# Patient Record
Sex: FEMALE | Race: WHITE | NOT HISPANIC OR LATINO
[De-identification: names, ages, dates, MRNs, and addresses within clinical notes are randomized per-mention and may not be internally consistent; named-entity substitution may affect disease eponyms.]

---

## 2023-06-08 ENCOUNTER — APPOINTMENT (OUTPATIENT)
Dept: DERMATOLOGY | Facility: CLINIC | Age: 53
End: 2023-06-08
Payer: COMMERCIAL

## 2023-06-08 VITALS — WEIGHT: 180 LBS | HEIGHT: 63 IN | BODY MASS INDEX: 31.89 KG/M2

## 2023-06-08 DIAGNOSIS — D22.9 MELANOCYTIC NEVI, UNSPECIFIED: ICD-10-CM

## 2023-06-08 DIAGNOSIS — D18.01 HEMANGIOMA OF SKIN AND SUBCUTANEOUS TISSUE: ICD-10-CM

## 2023-06-08 DIAGNOSIS — L82.1 OTHER SEBORRHEIC KERATOSIS: ICD-10-CM

## 2023-06-08 DIAGNOSIS — Z12.83 ENCOUNTER FOR SCREENING FOR MALIGNANT NEOPLASM OF SKIN: ICD-10-CM

## 2023-06-08 DIAGNOSIS — D48.5 NEOPLASM OF UNCERTAIN BEHAVIOR OF SKIN: ICD-10-CM

## 2023-06-08 PROBLEM — Z00.00 ENCOUNTER FOR PREVENTIVE HEALTH EXAMINATION: Status: ACTIVE | Noted: 2023-06-08

## 2023-06-08 PROCEDURE — 99203 OFFICE O/P NEW LOW 30 MIN: CPT

## 2023-06-08 NOTE — HISTORY OF PRESENT ILLNESS
[FreeTextEntry1] : Skin lesions - NPA [de-identified] : # Mole/skin check\par Concerns today: Brown spot on R temple, pink spot on chest\par Sunscreen use: rarely \par Hx of blistering sun burns: no \par Hx of immunosuppression: no \par Personal history of skin cancer:no \par Family history of skin cancer:no \par Last CBE a few years ago by outside derm\par \par SH: Parents Paramjit and Sunni Fitzgerald are my patient

## 2023-06-08 NOTE — ASSESSMENT
[FreeTextEntry1] : # Neoplasm of uncertain behavior of skin - L chest \par # Neoplasm of uncertain behavior of skin - R temple\par - L chest: favor LPLK or SK, r/o early NMSC - will monitor (photo taken) FU 6 months\par - R temple: favor solar lentigo - will monitor (size and photo taken) FU 6 months\par \par # Cherry angiomas\par # Seborrheic keratosis\par - Benign skin lesions, no concerns\par \par # Lentigines\par # Multiple nevi\par - Benign appearing on today's examination\par - Monitor for change\par \par # Skin cancer screening\par - A complete skin exam was performed\par - No other concerning lesions identified\par - Photoprotection was discussed including sunscreen\par - ABCDEs of melanoma reviewed\par - Call for new or changing lesions\par - CBE yearly; spot check in 6 months\par

## 2023-06-08 NOTE — PHYSICAL EXAM
[Alert] : alert [Oriented x 3] : ~L oriented x 3 [Full Body Skin Exam Performed] : performed [FreeTextEntry3] : Pt consented to examination of external genitalia and buttocks\par Opaque nail polish on fingernails and toenails\par \par Diffuse brown/tan ill defined macules on sun exposed areas including approx 1.3 x 0.8 cm patch on R temple (photo taken)\par Pink and tan papule on L chest approx 6 mm\par Small erythematous papules scattered\par Stuck on brown papules scattered including R helix\par Symmetric brown macules and papules. No ABCD features. No concerning features on dermoscopy.\par \par

## 2024-04-16 ENCOUNTER — APPOINTMENT (OUTPATIENT)
Dept: DERMATOLOGY | Facility: CLINIC | Age: 54
End: 2024-04-16
Payer: COMMERCIAL

## 2024-04-16 VITALS — HEIGHT: 63 IN | BODY MASS INDEX: 32.78 KG/M2 | WEIGHT: 185 LBS

## 2024-04-16 DIAGNOSIS — L30.9 DERMATITIS, UNSPECIFIED: ICD-10-CM

## 2024-04-16 DIAGNOSIS — L81.4 OTHER MELANIN HYPERPIGMENTATION: ICD-10-CM

## 2024-04-16 PROCEDURE — 99213 OFFICE O/P EST LOW 20 MIN: CPT

## 2024-04-16 RX ORDER — TRIAMCINOLONE ACETONIDE 1 MG/G
0.1 CREAM TOPICAL
Qty: 1 | Refills: 1 | Status: ACTIVE | COMMUNITY
Start: 2024-04-16 | End: 1900-01-01

## 2024-04-16 NOTE — HISTORY OF PRESENT ILLNESS
[FreeTextEntry1] : lesion-RPA [de-identified] : lesion location: right temple duration: years Dr briscoe took pictures previous office visit. Also had a recent rash on the lower leg.  Personal history of skin cancer:no  Family history of skin cancer:no

## 2024-04-16 NOTE — ASSESSMENT
[FreeTextEntry1] : 1) Lentigo, right temple - Size unchanged from prior visit - Counseled about clinically benign lesion - Discussed regular OTC sunscreen use, SPF 30 or higher   2) Lesion on chest - previously thought to be a potential LPLK. is no longer present today  3) Chronic venous stasis dermatitis, chronic, not active on exam today  - dx based on prior photos and other evidence of chronic venous stasis - Reviewed risks (as well as mitigation strategies for adverse drug events as applicable), benefits, and alternatives of therapy  - May use triamcinolone if it flares again  - Discussed use of compression stockings   RTC prn

## 2024-10-08 ENCOUNTER — APPOINTMENT (OUTPATIENT)
Dept: DERMATOLOGY | Facility: CLINIC | Age: 54
End: 2024-10-08